# Patient Record
Sex: MALE | URBAN - METROPOLITAN AREA
[De-identification: names, ages, dates, MRNs, and addresses within clinical notes are randomized per-mention and may not be internally consistent; named-entity substitution may affect disease eponyms.]

---

## 2022-01-01 ENCOUNTER — OFFICE VISIT (OUTPATIENT)
Dept: URBAN - METROPOLITAN AREA CLINIC 37 | Facility: CLINIC | Age: 42
End: 2022-01-01

## 2022-05-18 ENCOUNTER — WEB ENCOUNTER (OUTPATIENT)
Dept: URBAN - METROPOLITAN AREA CLINIC 35 | Facility: CLINIC | Age: 42
End: 2022-05-18

## 2022-05-19 ENCOUNTER — OFFICE VISIT (OUTPATIENT)
Dept: URBAN - METROPOLITAN AREA CLINIC 37 | Facility: CLINIC | Age: 42
End: 2022-05-19
Payer: COMMERCIAL

## 2022-05-19 VITALS
DIASTOLIC BLOOD PRESSURE: 82 MMHG | HEIGHT: 68 IN | WEIGHT: 192 LBS | OXYGEN SATURATION: 100 % | SYSTOLIC BLOOD PRESSURE: 126 MMHG | HEART RATE: 77 BPM | BODY MASS INDEX: 29.1 KG/M2

## 2022-05-19 DIAGNOSIS — R12 HEARTBURN: ICD-10-CM

## 2022-05-19 DIAGNOSIS — R19.7 DIARRHEA, UNSPECIFIED TYPE: ICD-10-CM

## 2022-05-19 PROBLEM — 16331000: Status: ACTIVE | Noted: 2022-05-19

## 2022-05-19 PROBLEM — 62315008: Status: ACTIVE | Noted: 2022-05-19

## 2022-05-19 PROCEDURE — 99204 OFFICE O/P NEW MOD 45 MIN: CPT | Performed by: INTERNAL MEDICINE

## 2022-05-19 RX ORDER — SACCHAROMYCES BOULARDII 50 MG
1 CAPSULE CAPSULE ORAL TWICE A DAY
Qty: 60 | Refills: 2 | OUTPATIENT
Start: 2022-05-19 | End: 2022-01-01

## 2022-05-19 RX ORDER — PANTOPRAZOLE SODIUM 40 MG/1
1 TABLET TABLET, DELAYED RELEASE ORAL ONCE A DAY
Status: ACTIVE | COMMUNITY

## 2022-05-19 RX ORDER — SUCRALFATE 1 G/1
1 TABLET ON AN EMPTY STOMACH TABLET ORAL TWICE A DAY
Qty: 60 | Refills: 0 | OUTPATIENT
Start: 2022-05-19 | End: 2022-06-18

## 2022-05-19 RX ORDER — PANTOPRAZOLE SODIUM 40 MG/1
1 TABLET TABLET, DELAYED RELEASE ORAL
Qty: 90 | Refills: 1 | OUTPATIENT
Start: 2022-05-19

## 2022-05-19 RX ORDER — SUCRALFATE 1 G/1
1 TABLET ON AN EMPTY STOMACH TABLET ORAL TWICE A DAY
Status: ACTIVE | COMMUNITY

## 2022-05-19 NOTE — HPI-ABDOMINAL PAIN
Patient is here for an initial consultation due to abdominal pain. Onset of symptoms: about a year ago, worse in the last 1 month Locations: epigastrium  Severity: dull pain Associated symptoms: Patient reports intemittent pyrosis Patient denies dysphagia/odynophagia Aggravating factors: baked beans, barbecue Medications tried: Patient was previously taking OTC Prilosec 20 mg daily for 1 month and reports he still had intermittent epigastric discomfort while taking Prilosec. Patient recently started taking Pantoprazole 40 mg daily for about 2 days Also was recently seen by a physician via telehealth and was prescribed Sucralfate 1 gm TID- BID 2 days ago Test/evaluations done previously: denies prior EGD

## 2022-05-19 NOTE — HPI-CHANGE IN BOWEL HABITS
Onset of symptoms: 1 month ago Description: Patient reports type 5 an 6 stools on BS scale, with 4-5 BM/daily that are post-prandial, an 1 hr after eating. Associated symptoms: Flatulence and belching Patient reports intermittent rectal bleeding that is present on TP.  Patient denies melena or BRB per rectum.  Patient also reports intermittent sharp lower abdominal pain Test/evaluations done previously: denies prior Colonoscopy

## 2022-05-19 NOTE — PHYSICAL EXAM GASTROINTESTINAL
Abdomen , soft, nontender, nondistended , no guarding or rigidity , no masses palpable , normal bowel sounds , Liver and Spleen , no hepatomegaly present , no hepatosplenomegaly , liver nontender , spleen not palpable 5

## 2022-06-02 ENCOUNTER — TELEPHONE ENCOUNTER (OUTPATIENT)
Dept: URBAN - METROPOLITAN AREA CLINIC 37 | Facility: CLINIC | Age: 42
End: 2022-06-02

## 2022-06-02 ENCOUNTER — LAB OUTSIDE AN ENCOUNTER (OUTPATIENT)
Dept: URBAN - METROPOLITAN AREA CLINIC 37 | Facility: CLINIC | Age: 42
End: 2022-06-02

## 2022-06-02 PROBLEM — 15634181000119107 CT OF ABDOMEN ABNORMAL: Status: ACTIVE | Noted: 2022-06-02

## 2022-06-02 RX ORDER — POLYETHYLENE GLYCOL 3350, SODIUM SULFATE, SODIUM CHLORIDE, POTASSIUM CHLORIDE, ASCORBIC ACID, SODIUM ASCORBATE 140-9-5.2G
AS DIRECTED KIT ORAL ONCE
Qty: 140 GRAM | Refills: 0 | OUTPATIENT
Start: 2022-06-02 | End: 2022-06-04

## 2022-06-07 ENCOUNTER — OUT OF OFFICE VISIT (OUTPATIENT)
Dept: URBAN - METROPOLITAN AREA MEDICAL CENTER 10 | Facility: MEDICAL CENTER | Age: 42
End: 2022-06-07
Payer: COMMERCIAL

## 2022-06-07 DIAGNOSIS — D12.3 ADENOMA OF TRANSVERSE COLON: ICD-10-CM

## 2022-06-07 DIAGNOSIS — K62.1 ANAL AND RECTAL POLYP: ICD-10-CM

## 2022-06-07 DIAGNOSIS — K29.60 ADENOPAPILLOMATOSIS GASTRICA: ICD-10-CM

## 2022-06-07 DIAGNOSIS — D50.9 ANEMIA: ICD-10-CM

## 2022-06-07 DIAGNOSIS — D12.5 ADENOMA OF SIGMOID COLON: ICD-10-CM

## 2022-06-07 DIAGNOSIS — R93.3 ABN FINDINGS-GI TRACT: ICD-10-CM

## 2022-06-07 DIAGNOSIS — K22.89 DILATATION OF ESOPHAGUS: ICD-10-CM

## 2022-06-07 DIAGNOSIS — C18.2 ASCENDING COLON CANCER: ICD-10-CM

## 2022-06-07 PROCEDURE — 45381 COLONOSCOPY SUBMUCOUS NJX: CPT | Performed by: INTERNAL MEDICINE

## 2022-06-07 PROCEDURE — 45380 COLONOSCOPY AND BIOPSY: CPT | Performed by: INTERNAL MEDICINE

## 2022-06-07 PROCEDURE — 45385 COLONOSCOPY W/LESION REMOVAL: CPT | Performed by: INTERNAL MEDICINE

## 2022-06-07 PROCEDURE — 43239 EGD BIOPSY SINGLE/MULTIPLE: CPT | Performed by: INTERNAL MEDICINE

## 2022-06-08 ENCOUNTER — OUT OF OFFICE VISIT (OUTPATIENT)
Dept: URBAN - METROPOLITAN AREA MEDICAL CENTER 10 | Facility: MEDICAL CENTER | Age: 42
End: 2022-06-08
Payer: COMMERCIAL

## 2022-06-08 ENCOUNTER — OFFICE VISIT (OUTPATIENT)
Dept: URBAN - METROPOLITAN AREA SURGERY CENTER 8 | Facility: SURGERY CENTER | Age: 42
End: 2022-06-08

## 2022-06-08 DIAGNOSIS — R10.13 ABDOMINAL DISCOMFORT, EPIGASTRIC: ICD-10-CM

## 2022-06-08 DIAGNOSIS — C18.9 ADENOCARCINOMA OF COLON: ICD-10-CM

## 2022-06-08 DIAGNOSIS — D50.9 ANEMIA: ICD-10-CM

## 2022-06-08 PROCEDURE — 992 APS NON BILLABLE: Performed by: PHYSICIAN ASSISTANT

## 2022-06-08 PROCEDURE — 99232 SBSQ HOSP IP/OBS MODERATE 35: CPT | Performed by: INTERNAL MEDICINE

## 2022-06-15 PROBLEM — 196731005 GASTRODUODENITIS: Status: ACTIVE | Noted: 2022-06-15

## 2022-06-30 ENCOUNTER — OFFICE VISIT (OUTPATIENT)
Dept: URBAN - METROPOLITAN AREA CLINIC 37 | Facility: CLINIC | Age: 42
End: 2022-06-30

## 2022-06-30 VITALS — HEIGHT: 68 IN

## 2023-01-01 ENCOUNTER — OFFICE VISIT (OUTPATIENT)
Dept: URBAN - METROPOLITAN AREA CLINIC 35 | Facility: CLINIC | Age: 43
End: 2023-01-01
Payer: COMMERCIAL

## 2023-01-01 ENCOUNTER — DASHBOARD ENCOUNTERS (OUTPATIENT)
Age: 43
End: 2023-01-01

## 2023-01-01 VITALS
WEIGHT: 189 LBS | DIASTOLIC BLOOD PRESSURE: 78 MMHG | HEIGHT: 68 IN | HEART RATE: 84 BPM | OXYGEN SATURATION: 97 % | SYSTOLIC BLOOD PRESSURE: 118 MMHG | BODY MASS INDEX: 28.64 KG/M2

## 2023-01-01 DIAGNOSIS — K29.70 GASTRITIS, UNSPECIFIED, WITHOUT BLEEDING: ICD-10-CM

## 2023-01-01 DIAGNOSIS — C18.2 MALIGNANT NEOPLASM OF ASCENDING COLON: ICD-10-CM

## 2023-01-01 DIAGNOSIS — R74.8 ELEVATED LIVER ENZYMES: ICD-10-CM

## 2023-01-01 PROCEDURE — 99213 OFFICE O/P EST LOW 20 MIN: CPT | Performed by: INTERNAL MEDICINE

## 2023-01-01 RX ORDER — ONDANSETRON 4 MG/1
1 TABLET ON THE TONGUE AND ALLOW TO DISSOLVE TABLET, ORALLY DISINTEGRATING ORAL ONCE A DAY
Status: ACTIVE | COMMUNITY

## 2023-01-01 RX ORDER — DIPHENOXYLATE HYDROCHLORIDE AND ATROPINE SULFATE 2.5; .025 MG/1; MG/1
1 TABLET AS NEEDED TABLET ORAL DAILY
Status: ACTIVE | COMMUNITY

## 2023-01-01 RX ORDER — LORAZEPAM 0.5 MG/1
1 TABLET AT BEDTIME AS NEEDED TABLET ORAL ONCE A DAY
Status: ACTIVE | COMMUNITY

## 2023-01-01 RX ORDER — PANTOPRAZOLE SODIUM 40 MG/1
1 TABLET TABLET, DELAYED RELEASE ORAL ONCE A DAY
Status: ON HOLD | COMMUNITY

## 2023-01-01 RX ORDER — CHOLECALCIFEROL (VITAMIN D3) 50 MCG
1 TABLET TABLET ORAL ONCE A DAY
Status: ACTIVE | COMMUNITY

## 2023-01-01 RX ORDER — GABAPENTIN 600 MG/1
1 TABLET TABLET, FILM COATED ORAL THREE TIMES A DAY
Status: ACTIVE | COMMUNITY

## 2023-01-01 RX ORDER — ALUMINUM ZIRCONIUM TRICHLOROHYDREX GLY 0.19 G/G
1 TABLET 30 MINUTES BEFORE MORNING MEAL STICK TOPICAL ONCE A DAY
Status: ACTIVE | COMMUNITY

## 2023-01-01 RX ORDER — SUCRALFATE 1 G/1
1 TABLET ON AN EMPTY STOMACH TABLET ORAL TWICE A DAY
Status: ON HOLD | COMMUNITY

## 2023-01-01 RX ORDER — DEXAMETHASONE 6 MG/1
0.5 TABLET TABLET ORAL
Status: ACTIVE | COMMUNITY

## 2023-05-02 PROBLEM — 363412000 MALIGNANT TUMOR OF ASCENDING COLON: Status: ACTIVE | Noted: 2022-06-15

## 2023-05-02 PROBLEM — 707724006: Status: ACTIVE | Noted: 2023-01-01

## 2023-05-02 NOTE — PHYSICAL EXAM GASTROINTESTINAL
Abdomen , soft, nontender, distended/obese , no guarding or rigidity , no masses palpable , normal bowel sounds , Liver and Spleen , no hepatomegaly present , no hepatosplenomegaly , liver nontender , spleen not palpable